# Patient Record
Sex: MALE | Race: WHITE | Employment: FULL TIME | URBAN - METROPOLITAN AREA
[De-identification: names, ages, dates, MRNs, and addresses within clinical notes are randomized per-mention and may not be internally consistent; named-entity substitution may affect disease eponyms.]

---

## 2024-10-28 ENCOUNTER — OFFICE VISIT (OUTPATIENT)
Age: 59
End: 2024-10-28
Payer: COMMERCIAL

## 2024-10-28 VITALS
DIASTOLIC BLOOD PRESSURE: 70 MMHG | HEIGHT: 75 IN | OXYGEN SATURATION: 98 % | HEART RATE: 86 BPM | BODY MASS INDEX: 22.38 KG/M2 | SYSTOLIC BLOOD PRESSURE: 128 MMHG | WEIGHT: 180 LBS

## 2024-10-28 DIAGNOSIS — F03.B0 MODERATE DEMENTIA WITHOUT BEHAVIORAL DISTURBANCE, PSYCHOTIC DISTURBANCE, MOOD DISTURBANCE, OR ANXIETY, UNSPECIFIED DEMENTIA TYPE (HCC): ICD-10-CM

## 2024-10-28 DIAGNOSIS — F03.B0 MODERATE DEMENTIA WITHOUT BEHAVIORAL DISTURBANCE, PSYCHOTIC DISTURBANCE, MOOD DISTURBANCE, OR ANXIETY, UNSPECIFIED DEMENTIA TYPE (HCC): Primary | ICD-10-CM

## 2024-10-28 DIAGNOSIS — F10.10 ALCOHOL ABUSE, DAILY USE: ICD-10-CM

## 2024-10-28 PROCEDURE — 96136 PSYCL/NRPSYC TST PHY/QHP 1ST: CPT | Performed by: PSYCHIATRY & NEUROLOGY

## 2024-10-28 PROCEDURE — 99244 OFF/OP CNSLTJ NEW/EST MOD 40: CPT | Performed by: PSYCHIATRY & NEUROLOGY

## 2024-10-28 PROCEDURE — 96132 NRPSYC TST EVAL PHYS/QHP 1ST: CPT | Performed by: PSYCHIATRY & NEUROLOGY

## 2024-10-28 RX ORDER — TRAZODONE HYDROCHLORIDE 50 MG/1
50 TABLET, FILM COATED ORAL NIGHTLY
COMMUNITY

## 2024-10-28 RX ORDER — LISINOPRIL 20 MG/1
20 TABLET ORAL DAILY
COMMUNITY

## 2024-10-28 RX ORDER — DONEPEZIL HYDROCHLORIDE 10 MG/1
TABLET, FILM COATED ORAL
Qty: 90 TABLET | Refills: 2 | Status: SHIPPED | OUTPATIENT
Start: 2024-10-28

## 2024-10-28 RX ORDER — DULAGLUTIDE 1.5 MG/.5ML
1.5 INJECTION, SOLUTION SUBCUTANEOUS WEEKLY
COMMUNITY
Start: 2024-08-05

## 2024-10-28 NOTE — PROGRESS NOTES
68081 (16 minute minimum)  __16_ minutes were spent administering cognitive testing by medical assistant/nurse.   28926 (31 minute minimum)   _31__ minutes were spent reviewing and interpreting the cognitive testing results, integrating patient data, and discussing the results with the patient.      Cognitive Testing Evaluation     Introduction:     Christian Stafford    1965  Male   This 59 year old Male was administered a battery of neurocognitive testing on 10/28/2024.     Tests Administered:     Trails A, Trails B, Digit Symbol Substitution, Stroop, Immediate Recognition, Delayed Recognition   The combined test administration time was 16 minutes   Test Results:   Cognitive testing was provided via a battery of cognitive assessments. The pattern of test scores indicate that results are valid.  A Clinical Report with further description of scores and results is also available.   Overall: Patient tested in the 1st* percentile (standard score of 50*).   Trails A: Patient tested in the 1st percentile (scaled standard score of 52).  Trails B: Patient tested in the --* percentile (scaled standard score of null).  Digit Symbol Substitution: Patient tested in the 1st percentile (scaled standard score of 39).  Stroop: Patient tested in the 1st percentile (scaled standard score of 38).  Immediate Recognition: Patient tested in the 1st percentile (scaled standard score of 32).  Delayed Recognition: Patient tested in the 11th percentile (scaled standard score of 81).   * These assessments were not scored because they were potentially invalid, or the patient failed to complete in the allotted time.   Interpretation of Test Scores:     Examination of individual component tests shows:   Attention - Trails A: likely impairment  Mental Flexibility - Trails B: possible impairment  Executive Function - Digit Symbol Substitution: likely impairment  Executive Function - Stroop: likely impairment  Memory - Immediate 
History:   Procedure Laterality Date    WISDOM TOOTH EXTRACTION         FAMILY HISTORY:   Family History   Problem Relation Age of Onset    Ulcerative Colitis Mother     Diabetes Father    Father-\"memory impairment\"      SOCIAL HISTORY:  Social History     Socioeconomic History    Marital status:      Spouse name: None    Number of children: None    Years of education: None    Highest education level: None       MEDICATIONS:   Current Outpatient Medications   Medication Sig Dispense Refill    TRULICITY 1.5 MG/0.5ML SC injection Inject 1 mL into the skin once a week      metFORMIN (GLUCOPHAGE) 1000 MG tablet Take 1 tablet by mouth 2 times daily (with meals)      lisinopril (PRINIVIL;ZESTRIL) 20 MG tablet Take 1 tablet by mouth daily      traZODone (DESYREL) 50 MG tablet Take 1 tablet by mouth nightly       No current facility-administered medications for this visit.       ALLERGIES:  No Known Allergies      REVIEW OF SYSTEMS:  10 point ROS reviewed with patient. Please see scanned document under media.       PHYSICAL EXAM:  Vital Signs: /70   Pulse 86   Ht 1.905 m (6' 3\")   Wt 81.6 kg (180 lb)   SpO2 98%   BMI 22.50 kg/m²      General Medical Exam:  General:  Well appearing, comfortable, in no apparent distress.  Eyes/ENT: see cranial nerve examination.  Neck: No masses appreciated.  Full range of motion without tenderness.  Respiratory:  Clear to auscultation, good air entry bilaterally.  Cardiac:  Regular rate and rhythm, no murmur.   GI:  Soft, non-tender, non-distended abdomen.  Bowel sounds normal. No masses, organomegaly.   Extremities:  No deformities, edema, or skin discoloration.Skin:  No rashes or lesions.    Neurological:  Mental Status:  Alert with fluent speech.    MOCA: 19/30   Cranial Nerves:   CNII/III/IV/VI: visual fields full to confrontation, EOMI, PERRL, no ptosis or nystagmus.   CN V: Facial sensation intact bilaterally, masseter 5/5   CN VII: Facial muscles symmetric and strong

## 2024-11-01 ENCOUNTER — TELEPHONE (OUTPATIENT)
Age: 59
End: 2024-11-01

## 2024-11-01 LAB
TSH SERPL DL<=0.005 MIU/L-ACNC: 2.98 UIU/ML (ref 0.45–4.5)
VIT B12 SERPL-MCNC: 477 PG/ML (ref 232–1245)

## 2024-12-06 ENCOUNTER — HOSPITAL ENCOUNTER (OUTPATIENT)
Facility: HOSPITAL | Age: 59
Discharge: HOME | End: 2024-12-09
Attending: PSYCHIATRY & NEUROLOGY
Payer: COMMERCIAL

## 2024-12-06 DIAGNOSIS — F03.B0 MODERATE DEMENTIA WITHOUT BEHAVIORAL DISTURBANCE, PSYCHOTIC DISTURBANCE, MOOD DISTURBANCE, OR ANXIETY, UNSPECIFIED DEMENTIA TYPE (HCC): ICD-10-CM

## 2024-12-06 PROCEDURE — 70551 MRI BRAIN STEM W/O DYE: CPT

## 2024-12-09 ENCOUNTER — TELEPHONE (OUTPATIENT)
Age: 59
End: 2024-12-09

## 2024-12-09 NOTE — TELEPHONE ENCOUNTER
Called and spoke with the Pt. Per :    MRI Brain does not reveal evidence of any acute pathology. RMD     Per Pt request placed a copy in the mail to confirmed address on file .

## 2024-12-09 NOTE — TELEPHONE ENCOUNTER
----- Message from Dr. Uma Serrano DO sent at 12/9/2024  8:29 AM EST -----  MRI Brain does not reveal evidence of any acute pathology. RMD  ----- Message -----  From: Asaf Murray Incoming Orders Results To [facility]  Sent: 12/6/2024   5:55 PM EST  To: Uma Serrano DO

## 2025-06-09 ENCOUNTER — OFFICE VISIT (OUTPATIENT)
Age: 60
End: 2025-06-09
Payer: COMMERCIAL

## 2025-06-09 VITALS
DIASTOLIC BLOOD PRESSURE: 80 MMHG | HEIGHT: 75 IN | SYSTOLIC BLOOD PRESSURE: 138 MMHG | WEIGHT: 174 LBS | HEART RATE: 75 BPM | OXYGEN SATURATION: 97 % | BODY MASS INDEX: 21.64 KG/M2

## 2025-06-09 DIAGNOSIS — Z87.898 HISTORY OF HEAVY ALCOHOL CONSUMPTION: ICD-10-CM

## 2025-06-09 DIAGNOSIS — R47.89 WORD FINDING DIFFICULTY: ICD-10-CM

## 2025-06-09 DIAGNOSIS — R47.89 WORD FINDING DIFFICULTY: Primary | ICD-10-CM

## 2025-06-09 DIAGNOSIS — F41.9 ANXIETY: ICD-10-CM

## 2025-06-09 DIAGNOSIS — F03.B0 MODERATE DEMENTIA WITHOUT BEHAVIORAL DISTURBANCE, PSYCHOTIC DISTURBANCE, MOOD DISTURBANCE, OR ANXIETY, UNSPECIFIED DEMENTIA TYPE (HCC): Primary | ICD-10-CM

## 2025-06-09 PROCEDURE — 99214 OFFICE O/P EST MOD 30 MIN: CPT | Performed by: PSYCHIATRY & NEUROLOGY

## 2025-06-09 PROCEDURE — 90791 PSYCH DIAGNOSTIC EVALUATION: CPT | Performed by: STUDENT IN AN ORGANIZED HEALTH CARE EDUCATION/TRAINING PROGRAM

## 2025-06-09 RX ORDER — DONEPEZIL HYDROCHLORIDE 10 MG/1
10 TABLET, FILM COATED ORAL NIGHTLY
Qty: 90 TABLET | Refills: 3 | Status: SHIPPED | OUTPATIENT
Start: 2025-06-09

## 2025-06-09 NOTE — PROGRESS NOTES
INTAKE NOTE     Name: Christian Stafford MRN: [id number]   Age: 59 y.o.  YOB: 1965   Gender: male Date of Intake: 6/9/2025   Race/Ethnicity: White (non-)     Education: High school     Handedness: Right Referral Source: Uma Serrano DO (Neurology)     EVALUATION METHODS: The following information was gathered from a clinical interview with Mr. Stafford and a review of available medical records. This evaluation was conducted for clinical treatment planning and may not be valid for other purposes. Potential risks and benefits, limits of confidentiality, and evaluation procedures were discussed. Mr. Stafford expressed an understanding of the purpose of the visit and consented to the procedures.     REASON FOR REFERRAL: Mr. Stafford was referred by his neurologist to evaluate cognitive functioning and rule out impairment in the context of moderate dementia, determined via brief cognitive tsting. He was seen for initial neurological consultation on 10/28/2024 to evaluate memory impairment. He reported chronic excessive ETOH intake with concern for alcohol-related dementia. There is also a family history of unspecified memory loss without formal diagnosis on the paternal side. MoCA = 19/30. BrainCheck was in the 1st percentile compared to similarly aged and gendered individuals, suggesting likely presence of cognitive impairment. He was referred for our services to establish a baseline and evaluate any contributing factors including stress potentially contributing to cognitive impairments. He was trialed for donepezil. Encouraged cessation of alcohol. Information gathered today will assist in differential diagnosis and treatment planning/recommendations.     PRESENTING CONCERNS     Cognitive Functioning: Cognitive difficulties have been prominent within the last couple years, his primary concern is about word finding difficulties. It was notably difficult at first outset of the appointment for

## 2025-06-09 NOTE — PROGRESS NOTES
Neurology Clinic Follow up Note    Patient ID:  Christian Stafford  [id number]  59 y.o.  1965      Mr. Stafford is here for follow up today of  Chief Complaint   Patient presents with    OTHER     Pt returning for H/O Dementia Pt reports things are about the same           Last Appointment With Me:  10/28/2024      Interval History:   Pt denies any significant decline in memory since last visit. Completed neuropsychologic testing earlier today with results pending. Still having issues with word finding on occasion. He has cut back ETOH intake since last visit (previously drinking 4+ beers daily and now drinking 3 glasses of wine weekly).   Taking Aricept as prescribed to assist with cognitive deficits. No reported side effects. He has noted some mild benefit with  medication.     Ability to function:  Driving: No issues currently  Finances: Handles without difficulty  Manages own medication: Yes, no issues  Residing: At home with his wife      PMHx/ PSHx/ FHx/ SHx:  Reviewed and unchanged previous visit.   Past Medical History:   Diagnosis Date    Diabetes mellitus (HCC)     Hypertension     Memory disorder        ROS:  Comprehensive review of systems negative except for as noted above.       Objective:       Meds:  Current Outpatient Medications   Medication Sig Dispense Refill    TRULICITY 1.5 MG/0.5ML SC injection Inject 0.5 mLs into the skin once a week      metFORMIN (GLUCOPHAGE) 1000 MG tablet Take 1 tablet by mouth 2 times daily (with meals)      lisinopril (PRINIVIL;ZESTRIL) 20 MG tablet Take 1 tablet by mouth daily      traZODone (DESYREL) 50 MG tablet Take 1 tablet by mouth nightly      donepezil (ARICEPT) 10 MG tablet Start Aricept 5mg qhs x 1 week, then 10mg qhs 90 tablet 2     No current facility-administered medications for this visit.       Exam:  /80   Pulse 75   Ht 1.905 m (6' 3\")   Wt 78.9 kg (174 lb)   SpO2 97%   BMI 21.75 kg/m²   NEUROLOGICAL EXAM:  General: Awake, alert, episodic

## 2025-06-11 ENCOUNTER — TELEPHONE (OUTPATIENT)
Age: 60
End: 2025-06-11

## 2025-08-12 ENCOUNTER — PROCEDURE VISIT (OUTPATIENT)
Age: 60
End: 2025-08-12
Payer: COMMERCIAL

## 2025-08-12 DIAGNOSIS — G31.01 PRIMARY PROGRESSIVE APHASIA (HCC): ICD-10-CM

## 2025-08-12 DIAGNOSIS — F02.80 PRIMARY PROGRESSIVE APHASIA (HCC): ICD-10-CM

## 2025-08-12 DIAGNOSIS — G31.84 MILD COGNITIVE IMPAIRMENT: Primary | ICD-10-CM

## 2025-08-12 PROCEDURE — 96133 NRPSYC TST EVAL PHYS/QHP EA: CPT | Performed by: STUDENT IN AN ORGANIZED HEALTH CARE EDUCATION/TRAINING PROGRAM

## 2025-08-12 PROCEDURE — 96138 PSYCL/NRPSYC TECH 1ST: CPT | Performed by: STUDENT IN AN ORGANIZED HEALTH CARE EDUCATION/TRAINING PROGRAM

## 2025-08-12 PROCEDURE — 96139 PSYCL/NRPSYC TST TECH EA: CPT | Performed by: STUDENT IN AN ORGANIZED HEALTH CARE EDUCATION/TRAINING PROGRAM

## 2025-08-12 PROCEDURE — 96132 NRPSYC TST EVAL PHYS/QHP 1ST: CPT | Performed by: STUDENT IN AN ORGANIZED HEALTH CARE EDUCATION/TRAINING PROGRAM

## 2025-08-27 ENCOUNTER — OFFICE VISIT (OUTPATIENT)
Age: 60
End: 2025-08-27
Payer: COMMERCIAL

## 2025-08-27 DIAGNOSIS — G31.01 PRIMARY PROGRESSIVE APHASIA (HCC): ICD-10-CM

## 2025-08-27 DIAGNOSIS — G31.84 MILD COGNITIVE IMPAIRMENT: Primary | ICD-10-CM

## 2025-08-27 DIAGNOSIS — F02.80 PRIMARY PROGRESSIVE APHASIA (HCC): ICD-10-CM

## 2025-08-27 PROCEDURE — 96132 NRPSYC TST EVAL PHYS/QHP 1ST: CPT | Performed by: STUDENT IN AN ORGANIZED HEALTH CARE EDUCATION/TRAINING PROGRAM
